# Patient Record
(demographics unavailable — no encounter records)

---

## 2017-01-01 NOTE — NBPN
===========================

Datetime: 2017 17:45

===========================

   

Nsy Prov Gen Appearance:  Within Normal Limits

Nsy Prov Skin:  Within Normal Limits

Nsy Prov Neuro:  Normal Tone; Singh; Grasp; Root; Suck

Nsy Prov Musculoskeletal:  Within Normal Limits; Full Range of Motion; Spontaneous Movement All Extre
mities; Intact Clavicles; Clavicles without Crepitus; Gluteal Folds Symmetrical; Spine Within Normal 
Limits; No Sacral Dimple/Cyst

Nsy Prov Head:  Normal Fontanelles; Normocephalic; Sutures WNL

Nsy Prov EENT:  Mouth Within Normal Limits; Ears Within Normal Limits; Eyes Within Normal Limits; Eye
s Red Reflex Bilaterally; Nose Within Normal Limits; Face Within Normal Limits

Nsy Prov Cardiovascular:  Within Normal Limits; Normal Pulses

Nsy Prov Respiratory:  Within Normal Limits

Nsy Prov GI:  Within Normal Limits; Soft; Normal Liver; Non Palpable Spleen; Patent Anus

Nsy Prov Umbilicus:  Within Normal Limits; Three Vessel Cord

Nsy Prov :  Normal Female Genitalia

Nsy Prov Impression:  Healthy Term ; Vital Signs Appropriate; Bonding Appropriately; Voiding a
nd Stooling

Nsy Prov Plan:  Continue  Care

   

===========================

Datetime: 2017 14:24

===========================

   

Nsy Prov Impression/Plan Details:  term  female sga

Nsy Prov Laboratory:  accucheck protocol

## 2017-01-01 NOTE — NBADN
===========================

Datetime: 2017 14:24

===========================

   

Nsy Prov Gen Appearance:  Within Normal Limits

Nsy Prov Gen Appearance:  Within Normal Limits

Nsy Prov Skin:  Within Normal Limits

Nsy Prov Neuro:  Normal Tone; [place]; Grasp; Root; Suck

Nsy Prov Musculoskeletal:  Within Normal Limits; Full Range of Motion; Spontaneous Movement All Extre
mities; Intact Clavicles; Clavicles without Crepitus; Gluteal Folds Symmetrical; Spine Within Normal 
Limits; No Sacral Dimple/Cyst

Nsy Prov Head:  Normal Fontanelles; Normocephalic; Sutures WNL

Nsy Prov EENT:  Mouth Within Normal Limits; Ears Within Normal Limits; Eyes Within Normal Limits; Eye
s Red Reflex Bilaterally; Nose Within Normal Limits; Face Within Normal Limits

Nsy Prov Cardiovascular:  Within Normal Limits; Normal Pulses

Nsy Prov Respiratory:  Within Normal Limits

Nsy Prov GI:  Within Normal Limits; Soft; Normal Liver; Non Palpable Spleen; Patent Anus

Nsy Prov Umbilicus:  Within Normal Limits; Three Vessel Cord

Nsy Prov :  Normal Female Genitalia

Nsy Prov Impression:  Healthy Term ; Vital Signs Appropriate; Bonding Appropriately; Voiding a
nd Stooling

Nsy Prov Plan:  Continue [place] Care

Nsy Prov Impression/Plan Details:  term  female sga

Nsy Prov Laboratory:  accucheck protocol

   

===========================

Datetime: 2017 14:22

===========================

   

Method of Delivery:  Vaginal

Infant Birthdate and Time:  2017 13:26

Gestational Age at Deliv:  39.0

Infant Sex - 1:  Female

Fetal Presentation:  Cephalic

Apgar Score 1, NB:  9

Apgar Score5, NB:  9

Mother's PT-AGE:  20

Mother's :  2

Mother's Para:  0

Mother's :  0

Mother's Abortions Induced:  0

Mother's Abortions Sponteneous:  0

Mother's Livin

Mother's Primary Language MBL:  English

Mother's Blood Type:  O Negative

Mother's Group B Beta Strep:  Negative

Mother's Hepatitis B:  Negative

Mother's Gonorrhea:  Negative

Mothers Chlamydia MBL:  Negative

Mother's Tobacco Use MBL:  Former Smoker. [id number]

Mother's Cocaine/Crack MBL:  No

Mother's Illicit Drugs MBL:  No

Mothers Comments ACOG Med Hx MBL:   AB 1. Treated for GC 17.

Mother's Term:  0

Length of Rupture NB:  0.35

Admission Birthweight, NB:  2570

Infant Weight (lb) MBL:  5

Infant Weight (oz) MBL:  11

Mother's Primary Indication:  N/A

Mother's HIV+ Exposure Test MBL:  Negative

Mother's Steroids Given:  None

Mother's Steroids Not Admin:  Not Applicable

Mother's Anesthesia Labor:  Epidural

Mother's Delivery Anesthesia:  Epidural

Mother's Intrapartum Maternal Co:  None

Infant Cord Vessels:  3

Mother's RPR/VDRL:  Nonreactive

Mother's Marital Status:  SINGLE

Mother's Rule Inc Maternal Age:  Age <=35 at SILVESTRE

Mother's Rule Thalassemia:  No History of Thalassemia

Mother's Rule Neural Tube Defect:  No History of Neural Tube Defect 

Mother's Rule Congenital Heart:  No History of Congenital Heart Disease

Mother's Rule Down Syndrome:  No History of Down Syndrome

Mother's Rule Lucas-Sachs:  No History of Lucas-Sachs

Mother's Rule [place]:  No History of [place]

Mother's Rule Familial Dysauto:  No History of Familial Dysautonomia

Mother's Rule Sickle Cell:  No History of Sickle Cell Disease/Trait

Mother's Rule Hemophilia:  No History of Hemophilia/Blood Disorder

Mother's Rule Muscular Dystrophy:  No History of Muscular Dystrophy 

Mother's Rule Cystic Fibrosis:  No History of Cystic Fibrosis

Mother's Rule [place]'s Chor:  No History of [place]'s Chorea

Mother's Rule Mental Retardation:  No History of Mental Retardation/Autism

Mother's Rule Fragile X:  No History of Fragile X Testing

Mother's Rule Oth Inherited DO:  No History of Other Inherited/Chromosomal Disorders

Mother's Rule Maternal Metabolic:  No History of  Maternal Metabolic

Mother's Rule FOB Birth Defects:  No History of Pt Father or FOB Birth Defects

Mother's Rule Hx Stillborn MBL:  No History of Loss/Stillborn

Mother's Rule Other Genetic Hx:  No Other Genetic History

Mother's Rule Drugs/Medications:  No History of Drugs/Medications

Mother's Rule Gonorrhea:  No History of Gonorrhea

Mother's Rule Chlamydia:  No History of Chlamydia

Mother's Rule Syphilis:  No History of Syphilis

Mother's Rule HIV/AIDS Exp:  No History of HIV/Aids Exposure

Mother's Rule HPV:  No History of Human Papillomavirus

Mother's Rule Genital Herpes:  No History of Genital Herpes

Mother's Rule TB:  No History of Tuberculosis

Mother's Rule Hepatitis:  No History of Hepatitis

Mother's Rule Rash or Viral Ill:  No History of Rash or Viral Illness

Mother's Rule Diabetes:  No History of Diabetes

Mother's Rule Hypertension MBL:  No History of Hypertension

Mother's Rule Heart Disease:  No History of Heart Disease

Mother's Rule Autoimmune:  No History of Autoimmune Disorder

Mother's Rule Kidney Disease:  No History of Kidney Disease/UTI

Mother's Rule Neurologic:  No History of Neurologic/Epilepsy Disorders

Mother's Rule Psych Disorders:  No History of Psychiatric Disorder

Mother's Rule Depression/PP Dep:  No History of Depression/Postpartum Depression

Mother's Rule Hepaitis/tLiver:  No History of Hepatitis/Liver Disease

Mother's Rule Varicos/Phlebitis:  No History of Varicosities/Phlebitis

Mother's Rule Thyroid Dysfunct:  No History of Thyroid Dysfunction

Mother's Rule Trauma/Violence:  No History of Trauma/Violence

Mother's Rule Blood Transfusion:  No History of Blood Transfusions

Mother's Rule Sensitization:  No History of D (Rh) Sensitization

Mother's Rule Pulmonary:  No History of Pulmonary (Asthma, TB)

Mother's Rule Breast:  No Breast History

Mother's Rule Gyn Surgery:  No History of Gyn Surgery

Mother's Rule Hosp/Surgery:  No History of Hospitalization/Surgery

Mother's Rule Anesthetic Comp:  No History of Anesthetic Complications

Mother's Rule Abnormal Pap:  No History of Abnormal Pap Smear

Mother's Rule Uterine Anomaly:  No History of Uterine Anomaly/JUAN JOSE

Mother's Rule Infertility:  No History of Infertility

Mother's Rule ART Treatment:  No History of ART Treatment

Mother's Rule Other Med Disease:  No History of Other Medical Diseases

Mother's Rule Family History:  No Significant Family History

## 2017-01-01 NOTE — NBDCN
===========================

Datetime: 2017 09:52

===========================

   

Nsy Prov Gen Appearance:  Within Normal Limits

Nsy Prov Skin:  Within Normal Limits

Nsy Prov Neuro:  Normal Tone; [place]; Grasp; Root; Suck

Nsy Prov Musculoskeletal:  Within Normal Limits; Full Range of Motion; Spontaneous Movement All Extre
mities; Intact Clavicles; Clavicles without Crepitus; Gluteal Folds Symmetrical; Spine Within Normal 
Limits; No Sacral Dimple/Cyst

Nsy Prov Head:  Normal Fontanelles; Normocephalic; Sutures WNL

Nsy Prov EENT:  Mouth Within Normal Limits; Ears Within Normal Limits; Eyes Within Normal Limits; Eye
s Red Reflex Bilaterally; Nose Within Normal Limits; Face Within Normal Limits

Nsy Prov Cardiovascular:  Within Normal Limits; Normal Pulses

Nsy Prov Respiratory:  Within Normal Limits

Nsy Prov GI:  Within Normal Limits; Soft; Normal Liver; Non Palpable Spleen; Patent Anus

Nsy Prov Umbilicus:  Within Normal Limits; Three Vessel Cord

Nsy Prov :  Normal Female Genitalia

Nsy Prov Discharge:  Discharge Home Today; Healthy Term ; Vital Signs Appropriate

Prov Disch Referrals:  clinic

Nsy Prov Disch Comments:  term  female

Follow up in Weeks NB:  1 Week

   

===========================

Datetime: 2017 04:00

===========================

   

Formula Type:  Similac Advance

   

===========================

Datetime: 2017 21:00

===========================

   

Lab, Bilirubin Transcutaneous:  6.4

Peak Bilirubin Transcutaneous:  6.4

Hepatitis B Vaccine NB:  2017 00:00 (Annotations: rat @ 20:58 lot # [id number] exp 19)

 Screenin2017 21:00

   

===========================

Datetime: 2017 08:07

===========================

   

Hearing Screen Status:  Hearing Screen Complete

   

===========================

Datetime: 2017 16:20

===========================

   

Hearing Screen Result, NB:  Right Ear Pass; Left Ear Pass

   

===========================

Datetime: 2017 14:22

===========================

   

Infant Birthdate and Time:  2017 13:26

Infant Sex - 1:  Female

Gestational Age at Deliv:  39.0

Method of Delivery:  Vaginal

Vacuum Extraction:  N/A

Forceps:  N/A

Mother's Steroids Given:  None

Apgar Score 1, NB:  9

Apgar Score5, NB:  9

Maternal Amniotic Fluid Color:  Clear

Mother's Blood Type:  O Negative

Mother's Hepatitis B:  Negative

Mother's Gonorrhea:  Negative

Mother's Chlamydia:  Negative

Mother's RPR/VDRL:  Nonreactive

Mother's HIV+ Exposure Test MBL:  Negative

Mother's Hx Herpes:  No

Mother's Group Beta Strep:  Negative

Admission Birthweight, NB:  2570

Infant Weight (lb) MBL:  5

Infant Weight (oz) MBL:  11

Maternal Feeding Preference:  Both

   

===========================

Datetime: 2017 14:20

===========================

   

Length cms, NB:  44.50

Length in, NB:  17.52

Head Circumference (cm), NB:  31.50

Chest Circumference, NB:  31.00